# Patient Record
Sex: MALE | Race: AMERICAN INDIAN OR ALASKA NATIVE
[De-identification: names, ages, dates, MRNs, and addresses within clinical notes are randomized per-mention and may not be internally consistent; named-entity substitution may affect disease eponyms.]

---

## 2018-04-14 ENCOUNTER — HOSPITAL ENCOUNTER (EMERGENCY)
Dept: HOSPITAL 5 - ED | Age: 33
LOS: 1 days | Discharge: HOME | End: 2018-04-15
Payer: SELF-PAY

## 2018-04-14 DIAGNOSIS — L03.311: Primary | ICD-10-CM

## 2018-04-14 PROCEDURE — 85025 COMPLETE CBC W/AUTO DIFF WBC: CPT

## 2018-04-14 PROCEDURE — 90715 TDAP VACCINE 7 YRS/> IM: CPT

## 2018-04-14 PROCEDURE — 80048 BASIC METABOLIC PNL TOTAL CA: CPT

## 2018-04-14 PROCEDURE — 74177 CT ABD & PELVIS W/CONTRAST: CPT

## 2018-04-14 PROCEDURE — 82140 ASSAY OF AMMONIA: CPT

## 2018-04-14 PROCEDURE — 96361 HYDRATE IV INFUSION ADD-ON: CPT

## 2018-04-14 PROCEDURE — 87040 BLOOD CULTURE FOR BACTERIA: CPT

## 2018-04-14 PROCEDURE — 36415 COLL VENOUS BLD VENIPUNCTURE: CPT

## 2018-04-14 PROCEDURE — 96365 THER/PROPH/DIAG IV INF INIT: CPT

## 2018-04-14 PROCEDURE — 90471 IMMUNIZATION ADMIN: CPT

## 2018-04-14 PROCEDURE — 99284 EMERGENCY DEPT VISIT MOD MDM: CPT

## 2018-04-15 VITALS — DIASTOLIC BLOOD PRESSURE: 91 MMHG | SYSTOLIC BLOOD PRESSURE: 154 MMHG

## 2018-04-15 LAB
BASOPHILS # (AUTO): 0 K/MM3 (ref 0–0.1)
BASOPHILS NFR BLD AUTO: 0.4 % (ref 0–1.8)
BUN SERPL-MCNC: 9 MG/DL (ref 9–20)
BUN/CREAT SERPL: 10 %
CALCIUM SERPL-MCNC: 8.7 MG/DL (ref 8.4–10.2)
EOSINOPHIL # BLD AUTO: 0.1 K/MM3 (ref 0–0.4)
EOSINOPHIL NFR BLD AUTO: 1.9 % (ref 0–4.3)
HCT VFR BLD CALC: 45 % (ref 35.5–45.6)
HEMOLYSIS INDEX: 12
HGB BLD-MCNC: 15.9 GM/DL (ref 11.8–15.2)
LYMPHOCYTES # BLD AUTO: 1.6 K/MM3 (ref 1.2–5.4)
LYMPHOCYTES NFR BLD AUTO: 23.7 % (ref 13.4–35)
MCH RBC QN AUTO: 33 PG (ref 28–32)
MCHC RBC AUTO-ENTMCNC: 35 % (ref 32–34)
MCV RBC AUTO: 94 FL (ref 84–94)
MONOCYTES # (AUTO): 0.8 K/MM3 (ref 0–0.8)
MONOCYTES % (AUTO): 11.6 % (ref 0–7.3)
PLATELET # BLD: 172 K/MM3 (ref 140–440)
RBC # BLD AUTO: 4.8 M/MM3 (ref 3.65–5.03)

## 2018-04-15 NOTE — CAT SCAN REPORT
FINAL REPORT



EXAM:  CT ABDOMEN PELVIS W CON



HISTORY:  periumbilical abscess x 1 week 



TECHNIQUE:  Routine axial imaging was obtained of the abdomen and

pelvis following the intravenous injection of 100 cc of Omnipaque

300. Delayed imaging was obtained through the kidneys ureters and

bladder. Sagittal and coronal reconstructions were reviewed.



FINDINGS:  

There is subcutaneous induration of the tissues around the

umbilicus compatible with cellulitis. A discrete abscess is not

seen. There is no evidence of intraperitoneal inflammatory

process adjacent to this.



The lung bases are clear. Pleural fluid is not seen. The liver,

gallbladder, pancreas, spleen, and adrenal glands appear normal.

The kidneys enhance normally. The vascular structures enhance

normally. The bowel loops are normal in caliber and course. The

appendix is not seen with certainty. There is no evidence of free

fluid or adenopathy. In the pelvis the prostate gland and bladder

appear normal. The skeletal structures are well-maintained.



IMPRESSION:  

Induration of the periumbilical subcutaneous tissues compatible

with cellulitis. No evidence of abscess.



No acute process in the abdomen and pelvis otherwise.

## 2018-04-15 NOTE — EMERGENCY DEPARTMENT REPORT
- General


Chief complaint: Skin/Abscess/Foreign Body


Stated complaint: INSECT BITE


Time Seen by Provider: 04/15/18 01:45


Source: patient, family


Mode of arrival: Ambulatory


Limitations: No Limitations





- History of Present Illness


Initial comments: 





Patient reports that he has an abscess on his abdomen 1 week that's getting 

worse.  Reported increased redness and that he try to squeeze the area on his 

abdomen and he noticed that a small insect Robert would pass.  He reports 

swelling and redness around his abdominal area.  Pain is 10 out of 10 and sore.

  Denies any nausea or vomiting.  Denies any fever or chills.  He said he try 

to clean area with peroxide and over-the-counter pain medication but it didn't 

help.  Pain is worse with touch and movement but resting.  Denies any urinary 

burning frequency or urgency.  Tetanus vaccine is up-to-date pain is colitis to 

wear redness and swelling is located in abdomen.  Denies any back pain.


MD complaint: insect bite/sting, abscess/boil


Onset/Timin


-: week(s)


Tetanus Up to Date: no


Severity: severe (located to abdomen)


Severity scale (0 -10): 10


Quality: other (soreness)


Consistency: constant


Improves with: immobilization, rest


Worsens with: palpation, movement


Context: witnessed insect bite


Associated symptoms: itching, other (pain located around abdominal area at site 

where redness and swelling is located)


Treatments Prior to Arrival: attempted to drain pus at, OTC topical medication, 

other (over-the-counter pain medication)





- Related Data


 Previous Rx's











 Medication  Instructions  Recorded  Last Taken  Type


 


Ibuprofen [Motrin] 600 mg PO Q8H PRN #15 tablet 04/15/18 Unknown Rx


 


Sulfamethoxazole/Trimethoprim 1 each PO BID 10 Days #20 tablet 04/15/18 Unknown 

Rx





[Bactrim DS TAB]    











 Allergies











Allergy/AdvReac Type Severity Reaction Status Date / Time


 


No Known Allergies Allergy   Verified 04/15/18 01:18














Abscess Boil HPI





- HPI


Chief Complaint: Skin/Abscess/Foreign Body


Stated Complaint: INSECT BITE


Time Seen by Provider: 04/15/18 01:45


Home Medications: 


 Previous Rx's











 Medication  Instructions  Recorded  Last Taken  Type


 


Ibuprofen [Motrin] 600 mg PO Q8H PRN #15 tablet 04/15/18 Unknown Rx


 


Sulfamethoxazole/Trimethoprim 1 each PO BID 10 Days #20 tablet 04/15/18 Unknown 

Rx





[Bactrim DS TAB]    











Allergies/Adverse Reactions: 


 Allergies











Allergy/AdvReac Type Severity Reaction Status Date / Time


 


No Known Allergies Allergy   Verified 04/15/18 01:18














ED Review of Systems


ROS: 


Stated complaint: INSECT BITE


Other details as noted in HPI





Comment: All other systems reviewed and negative


Constitutional: no symptoms reported


Respiratory: no symptoms reported


Cardiovascular: denies: chest pain, palpitations, dyspnea on exertion, edema, 

syncope


Gastrointestinal: abdominal pain (located around navel at insect bite site).  

denies: nausea, vomiting, diarrhea, constipation, hematemesis, melena, 

hematochezia


Genitourinary: denies: dysuria, hematuria


Musculoskeletal: denies: back pain, joint swelling, arthralgia, myalgia


Skin: change in color, other (redness and swelling to abdomen with drainage)


Neurological: denies: headache, weakness, abnormal gait, vertigo





ED Past Medical Hx





- Past Medical History


Previous Medical History?: No





- Surgical History


Past Surgical History?: No





- Family History


Family history: no significant





- Social History


Smoking Status: Never Smoker


Substance Use Type: Alcohol





- Medications


Home Medications: 


 Home Medications











 Medication  Instructions  Recorded  Confirmed  Last Taken  Type


 


Ibuprofen [Motrin] 600 mg PO Q8H PRN #15 tablet 04/15/18  Unknown Rx


 


Sulfamethoxazole/Trimethoprim 1 each PO BID 10 Days #20 tablet 04/15/18  

Unknown Rx





[Bactrim DS TAB]     














ED Physical Exam





- General


Limitations: No Limitations


General appearance: alert, in no apparent distress





- Head


Head exam: Present: atraumatic, normocephalic, normal inspection





- Eye


Eye exam: Present: normal appearance, PERRL, EOMI.  Absent: periorbital swelling

, periorbital tenderness


Pupils: Present: normal accommodation





- ENT


ENT exam: Present: normal exam, normal orophraynx, mucous membranes moist





- Neck


Neck exam: Present: normal inspection, full ROM, other (no C-spine tenderness).

  Absent: tenderness, meningismus, lymphadenopathy





- Respiratory


Respiratory exam: Present: normal lung sounds bilaterally.  Absent: respiratory 

distress, chest wall tenderness





- Cardiovascular


Cardiovascular Exam: Present: regular rate, normal rhythm, normal heart sounds





- GI/Abdominal


GI/Abdominal exam: Present: soft, tenderness (tenderness around periumbilical 

area where patient has cellulitis.), normal bowel sounds.  Absent: distended, 

guarding, rebound, rigid, organomegaly, mass, bruit, pulsatile mass, hernia





- Extremities Exam


Extremities exam: Present: normal inspection, full ROM, normal capillary refill

, other (no clubbing, cyanosis or edema.  Positive pulses all extremities and 

no neurovascular compromise.).  Absent: tenderness, pedal edema, joint swelling

, calf tenderness





- Back Exam


Back exam: Present: normal inspection, full ROM, other (ambulates without any 

difficulties).  Absent: tenderness, CVA tenderness (R), CVA tenderness (L), 

muscle spasm, paraspinal tenderness, vertebral tenderness, rash noted





- Neurological Exam


Neurological exam: Present: alert, oriented X3, normal gait





- Psychiatric


Psychiatric exam: Present: normal affect, normal mood





- Skin


Skin exam: Present: warm, dry, erythema, other (abscess and cellulitis)





- Expanded Skin Exam


  ** Expanded


Type of lesion: Present: abscess, bite/sting


Distribution of rash: abdomen (periumbilical area)


Description of rash: Present: size (3 x 3 cm of erythema with 1 x 1 cm of 

indurated area.  Noted small opening to Center of indurated area.), tenderness, 

erythematous, swelling (mild swelling around periumbilical area), discharge (

scant amount of discharge noted), indurated.  Absent: petechial, purpuic, 

crusting, fluctuant





ED Course


 Vital Signs











  04/14/18 04/15/18 04/15/18





  20:27 01:06 02:02


 


Temperature 98.6 F  


 


Pulse Rate 76  


 


Respiratory 18 18 18





Rate   


 


Blood Pressure 170/100  


 


Blood Pressure   





[Left]   


 


O2 Sat by Pulse 98  





Oximetry   














  04/15/18





  03:08


 


Temperature 


 


Pulse Rate 74


 


Respiratory 18





Rate 


 


Blood Pressure 


 


Blood Pressure 154/91





[Left] 


 


O2 Sat by Pulse 99





Oximetry 














- Reevaluation(s)


Reevaluation #1: 





04/15/18 01:52


Patient received IV fluid normal saline 1 L, labs to be drawn to include blood 

cultures.  Antibiotic IV Zosyn after blood cultures drawn.  He was given 

Percocet 5/325 mg 2 tablets by mouth which helped his pain.  Patient awaiting 

CT scan with IV contrast after BMP resulted.


Reevaluation #2: 





04/15/18 03:35


Patient stable presents CT scan was done and the weight in to be read.  Pain is 

controlled.


Reevaluation #3: 





04/15/18 05:28


Wound care with normal saline and sterile dry gauze dressing placed inside.





ED Medical Decision Making





- Lab Data


Result diagrams: 


 04/15/18 01:59





 04/15/18 01:59








 Lab Results











  04/15/18 04/15/18 04/15/18 Range/Units





  01:59 01:59 01:59 


 


WBC  7.0    (4.5-11.0)  K/mm3


 


RBC  4.80    (3.65-5.03)  M/mm3


 


Hgb  15.9 H    (11.8-15.2)  gm/dl


 


Hct  45.0    (35.5-45.6)  %


 


MCV  94    (84-94)  fl


 


MCH  33 H    (28-32)  pg


 


MCHC  35 H    (32-34)  %


 


RDW  12.5 L    (13.2-15.2)  %


 


Plt Count  172    (140-440)  K/mm3


 


Lymph % (Auto)  23.7    (13.4-35.0)  %


 


Mono % (Auto)  11.6 H    (0.0-7.3)  %


 


Eos % (Auto)  1.9    (0.0-4.3)  %


 


Baso % (Auto)  0.4    (0.0-1.8)  %


 


Lymph #  1.6    (1.2-5.4)  K/mm3


 


Mono #  0.8    (0.0-0.8)  K/mm3


 


Eos #  0.1    (0.0-0.4)  K/mm3


 


Baso #  0.0    (0.0-0.1)  K/mm3


 


Seg Neutrophils %  62.4    (40.0-70.0)  %


 


Seg Neutrophils #  4.3    (1.8-7.7)  K/mm3


 


Sodium   141   (137-145)  mmol/L


 


Potassium   4.1   (3.6-5.0)  mmol/L


 


Chloride   99.6   ()  mmol/L


 


Carbon Dioxide   26   (22-30)  mmol/L


 


Anion Gap   20   mmol/L


 


BUN   9   (9-20)  mg/dL


 


Creatinine   0.9   (0.8-1.5)  mg/dL


 


Estimated GFR   > 60   ml/min


 


BUN/Creatinine Ratio   10   %


 


Glucose   96   ()  mg/dL


 


Lactic Acid    0.80  (0.7-2.0)  mmol/L


 


Calcium   8.7   (8.4-10.2)  mg/dL








Blood culture sent and pending





- Radiology Data


Radiology results: report reviewed





CT scan of abdomen and pelvis with IV contrast reveal patient with no acute 

processes in the abdomen and pelvis.  Induration of the periumbilical 

subcutaneous tissue compatible with cellulitis.  No evidence of abscess.  No 

evidence of free fluid or adenopathy.





- Medical Decision Making





ED course: Pt reports that he has swelling redness around his navel for 

approximately one week after bitten by insect that is getting worse.  Physical 

findings for abscess and cellulitis periumbilical area.  Scant amount of pus 

noted but unable to express pus from site due to induration with no fluctuance.

  Patient had CT scan of the abdomen and pelvis with IV contrast which revealed 

induration of the periumbilical subcutaneous tissue compatible with cellulitis 

and no evidence of abscess.  No acute process in the abdomen or pelvic area.  

Please refer to CT scan report in radiology section for details.  Patient had 

CBC which is stable, CMP table, lactic acid is normal and blood cultures are 

pending.  I discussed CT scan and lab result patient and he voiced 

understanding.  Patient and had IV fluids 1 L, Zosyn 4.5 g IV and emergency 

room without any adverse reaction.  He had Norco 5/325 mg 2 tablets by mouth 

for pain.  Given Boostrix 0.5 mL IM to update tetanus shot.  Affected area to 

periumbilical area cleansed with normal saline and sterile dry noted he 

suggested pesticide.  I discussed patient that he haswarm compresses to 

affected area 3 times a day to facilitate soft in and drainage of abscess.  He 

voiced understanding of discharge instruction and treatment plan.  I also 

instructed patient to follow up at [facility] in 3 days and if 

he cannot get in to return to the emergency room for reevaluation of abscess if 

it's not getting better.  Patient discharged home with his family in stable 

condition with prescription for Bactrim DS and Motrin.


Critical care attestation.: 


If time is entered above; I have spent that time in minutes in the direct care 

of this critically ill patient, excluding procedure time.








ED Disposition


Clinical Impression: 


 Abdominal wall cellulitis, Abdominal wall pain in periumbilical region





Disposition: DC-01 TO HOME OR SELFCARE


Is pt being admited?: No


Does the pt Need Aspirin: No


Condition: Stable


Instructions:  Cellulitis (ED), Abdominal Pain (ED), Acute Wound Care (ED)


Additional Instructions: 


Take antibiotic as prescribed


Follow-up with your primary care physician in 3 days and if he cannot get in at 

some outside Medical Center he can return to the emergency room for evaluation 

of cellulitic area around navel


Keep affected area clean and dry.


Take Motrin and this will help the pain.


Followed discharge instruction on acute wound care .


Please return to emergency room if you develop increasing redness, streaking, 

fever, and difficulty moving.


Prescriptions: 


Ibuprofen [Motrin] 600 mg PO Q8H PRN #15 tablet


 PRN Reason: Pain


Sulfamethoxazole/Trimethoprim [Bactrim DS TAB] 1 each PO BID 10 Days #20 tablet


Referrals: 


[facility] [Outside] - 18


Forms:  Accompanied Note, Work/School Release Form(ED)